# Patient Record
Sex: FEMALE | Race: WHITE | Employment: FULL TIME | URBAN - METROPOLITAN AREA
[De-identification: names, ages, dates, MRNs, and addresses within clinical notes are randomized per-mention and may not be internally consistent; named-entity substitution may affect disease eponyms.]

---

## 2020-04-16 ENCOUNTER — HOSPITAL ENCOUNTER (OUTPATIENT)
Age: 50
Discharge: HOME | End: 2020-04-18
Payer: COMMERCIAL

## 2020-04-16 LAB
T4 TOTAL: 6.1 MCG/DL (ref 4.5–11.7)
TSH SERPL DL<=0.05 MIU/L-ACNC: 0.67 UIU/ML (ref 0.27–4.2)

## 2020-04-16 PROCEDURE — 36415 COLL VENOUS BLD VENIPUNCTURE: CPT

## 2020-04-16 PROCEDURE — 84436 ASSAY OF TOTAL THYROXINE: CPT

## 2020-04-16 PROCEDURE — 84443 ASSAY THYROID STIM HORMONE: CPT

## 2020-04-20 ENCOUNTER — OFFICE VISIT (OUTPATIENT)
Dept: NEUROLOGY | Age: 50
End: 2020-04-20
Payer: COMMERCIAL

## 2020-04-20 VITALS
OXYGEN SATURATION: 98 % | BODY MASS INDEX: 25.03 KG/M2 | TEMPERATURE: 98.1 F | HEIGHT: 69 IN | HEART RATE: 84 BPM | RESPIRATION RATE: 12 BRPM | WEIGHT: 169 LBS | SYSTOLIC BLOOD PRESSURE: 106 MMHG | DIASTOLIC BLOOD PRESSURE: 74 MMHG

## 2020-04-20 PROCEDURE — 99204 OFFICE O/P NEW MOD 45 MIN: CPT | Performed by: NURSE PRACTITIONER

## 2020-04-20 RX ORDER — MIRTAZAPINE 15 MG/1
1 TABLET, FILM COATED ORAL NIGHTLY
COMMUNITY
Start: 2020-04-13

## 2020-04-20 RX ORDER — OMEPRAZOLE 40 MG/1
1 CAPSULE, DELAYED RELEASE ORAL DAILY
COMMUNITY
Start: 2020-04-10 | End: 2020-07-02

## 2020-04-20 RX ORDER — LEVOTHYROXINE SODIUM 0.03 MG/1
1 TABLET ORAL DAILY
COMMUNITY
Start: 2020-03-25

## 2020-04-20 RX ORDER — ATORVASTATIN CALCIUM 40 MG/1
1 TABLET, FILM COATED ORAL DAILY
COMMUNITY
Start: 2020-03-05

## 2020-04-20 RX ORDER — CLOPIDOGREL BISULFATE 75 MG/1
1 TABLET ORAL DAILY
COMMUNITY
Start: 2020-04-10

## 2020-04-20 NOTE — PROGRESS NOTES
smokeless tobacco. She reports that she does not drink alcohol or use drugs. Review of Systems:     (+) Tremors  (+) Anxiety  No chest pain or palpitations  No SOB  No vertigo, lightheadedness or loss of consciousness  No falls, tripping or stumbling  No incontinence of bowels or bladder  No itching or bruising appreciated  No numbness, tingling or focal arm/leg weakness    ROS is otherwise negative    Family History:     Family History   Problem Relation Age of Onset    Kidney Disease Mother     COPD Mother     Diabetes type 2  Mother     Cancer Mother     Cancer Father     Asthma Brother     Stroke Paternal Grandmother     Diabetes type 2  Maternal Aunt        History of Present Illness:     Patient presents to neurology clinic today for follow-up to her stroke in January. Patient states that she was residing in [facility] when she had a stroke, transferred to [address] [place] [address] for stroke admission. Patient states she was home with her fiancé when she began to have garbled speech, left facial droop and left hemiparesis. Patient also reports a right gaze preference. Patient's fiancé called 911 and patient was transferred to a stroke center in [place], [place]. Patient was found to have a small right insular cortex stroke on MRI from January 29, 2020. Patient did receive tPA. CT head and CTA head and neck showed hyperdense right M2 with acute cutoff at the M2/proximal M3 inferior division segment of right MCA. Patient reports she had clotting studies completed in addition to echo which she reports were normal.  Patient remains on aspirin and Plavix recently stopping statin as she thought her jitteriness was coming from statin use. Patient has been off of her statin for about 2 weeks and is to resume it on Friday. Patient states that clotting studies were unremarkable and the reason for her stroke was never found.   Patient is to see cardiology on April 29 for cardiac monitoring for 7 quivering/tremor noted intermittently  Language: no aphasias---reading, writing, repetition, and object identification intact    Cranial Nerves:  I: smell    II: visual acuity     II: visual fields Full to finger counting bilaterally   II: pupils PERRL   III,VII: ptosis None   III,IV,VI: extraocular muscles  EOMI without nystagmus   V: mastication Normal   V: facial light touch sensation  Normal   V,VII: corneal reflex     VII: facial muscle function - upper  Normal   VII: facial muscle function - lower Normal   VIII: hearing Normal   IX: soft palate elevation  Normal   IX,X: gag reflex    XI: trapezius strength  5/5   XI: sternocleidomastoid strength 5/5   XI: neck extension strength  5/5   XII: tongue strength  Normal     Motor:  5/5 throughout  Normal bulk and tone  No drift   Bilateral upper extremity tremors--- most noticed with extension    Sensory:  LT and PP normal  Vibration normal    Coordination:   FN, FFM and ISATU normal  HS normal    Gait:  Normal  Walks well on toes, heels, and tandem    DTR:   Right Brachioradialis reflex 2+  Left Brachioradialis reflex 2+  Right Biceps reflex 2+  Left Biceps reflex 2+  Right Triceps reflex 2+  Left Triceps reflex 2+  Right Quadriceps reflex 2+  Left Quadriceps reflex 2+  Right Achilles reflex 2+  Left Achilles reflex 2+    b/l Babinskis  No Rome's    No other pathological reflexes    Laboratory/Radiology:  ry/Radiology:     TSH:    Lab Results   Component Value Date    TSH 0.673 04/16/2020     Records from outside facility reviewed--- report only; no images to review. Assessment:     Right MCA distribution stroke in January 2020   Patient received TPA and MRI showed small right insular stroke.    Patient is awaiting cardiology evaluation scheduled for 4/29--- cardiac monitoring x7 days   Patient remains on aspirin and Plavix; statin to be resumed--- patient to monitor for side effects      Essential tremor   No signs of Parkinson's on exam today   Patient's paternal grandmother had essential tremor; patient's dad had Parkinson's   At present time patient will deferred treatment of this; will follow-up next visit   Essential tremor in addition to vocal quivering I believe is being worsened by anxiety    Anxiety   Continue Remeron per recommendations of PCP/psych   Recently Celexa discontinued     Plan: Will await cardiac evaluation   If 7-day cardiac monitor negative--> recommend 30-day monitoring  Advised patient to resume statin--monitor for side effects  Continue aspirin and Plavix  Obtain records from stroke admission at [address]   Call with any issues  Return to office in 3 months      RALPH Aaron-CNP  4:41 PM  4/20/2020    I spent 45 minutes with this patient obtaining the HPI and discussing the exam with greater than 50% of the time providing counseling and education on medications and other treatment plans. All questions were answered prior to leaving my office.

## 2020-06-16 ENCOUNTER — HOSPITAL ENCOUNTER (OUTPATIENT)
Age: 50
Discharge: HOME | End: 2020-06-18
Payer: COMMERCIAL

## 2020-06-16 LAB
T4 TOTAL: 6.7 MCG/DL (ref 4.5–11.7)
TSH SERPL DL<=0.05 MIU/L-ACNC: 1.64 UIU/ML (ref 0.27–4.2)

## 2020-06-16 PROCEDURE — 84443 ASSAY THYROID STIM HORMONE: CPT

## 2020-06-16 PROCEDURE — 84436 ASSAY OF TOTAL THYROXINE: CPT

## 2020-06-16 PROCEDURE — 36415 COLL VENOUS BLD VENIPUNCTURE: CPT

## 2020-07-01 NOTE — PROGRESS NOTES
[facility] Cardiology consult  Dr. Ervin Peña      Reason for Consult: CVA  Referring Physician: Kiya Dasilva DO     CHIEF COMPLAINT:   Chief Complaint   Patient presents with    New Patient     per Dr. Tonia Cooks for CVA in 2020-off & on chest pressure not specifically related to activity-no further hospitalizations-saw Dr. Osmel Rodriguez and wore monitor which showed possible a fib and loop recorder was recommended       HISTORY OF PRESENT ILLNESS:   53year old female with history of embolic MCA stroke, status post TPA, thyroid disease, hyperlipidemia and TIA is here for a cardiac evaluation. Patient denies any chest pain, no shortness of breath, no lightheadedness, no dizziness, no palpitations, no pedal edema, no PND, no orthopnea, no syncope, no presyncopal episodes. Past Medical History:   Diagnosis Date    Acute ischemic stroke ([facility] [address]      In 2020---recieved tPA    Arthritis     Depression     Hyperlipidemia     Thyroid disease          Past Surgical History:   Procedure Laterality Date     SECTION      CHOLECYSTECTOMY      HYSTERECTOMY      partial    TONSILLECTOMY      VASCULAR SURGERY      vein strip for varicose veins         Current Outpatient Medications   Medication Sig Dispense Refill    Multiple Vitamins-Minerals (MULTIVITAMIN ADULT PO) Take 1 tablet by mouth daily      atorvastatin (LIPITOR) 40 MG tablet Take 1 tablet by mouth daily      clopidogrel (PLAVIX) 75 MG tablet Take 1 tablet by mouth daily      levothyroxine (SYNTHROID) 25 MCG tablet Take 1 tablet by mouth daily      mirtazapine (REMERON) 15 MG tablet Take 1 tablet by mouth nightly      aspirin 81 MG tablet Take 81 mg by mouth daily       No current facility-administered medications for this visit.           Allergies as of 2020    (No Known Allergies)       Social History     Socioeconomic History    Marital status:      Spouse name: Not on file    Number of children: Not on file    Years of education: Not on file    Highest education level: Not on file   Occupational History    Not on file   Social Needs    Financial resource strain: Not on file    Food insecurity     Worry: Not on file     Inability: Not on file    Transportation needs     Medical: Not on file     Non-medical: Not on file   Tobacco Use    Smoking status: Current Some Day Smoker     Packs/day: 0.50     Years: 30.00     Pack years: 15.00     Types: Cigarettes     Last attempt to quit: 2020     Years since quittin.5    Smokeless tobacco: Never Used    Tobacco comment: trying to quit   Substance and Sexual Activity    Alcohol use: Yes     Comment: occasional wine-coffee 2 times weekly    Drug use: Never    Sexual activity: Not on file   Lifestyle    Physical activity     Days per week: Not on file     Minutes per session: Not on file    Stress: Not on file   Relationships    Social connections     Talks on phone: Not on file     Gets together: Not on file     Attends Restoration service: Not on file     Active member of club or organization: Not on file     Attends meetings of clubs or organizations: Not on file     Relationship status: Not on file    Intimate partner violence     Fear of current or ex partner: Not on file     Emotionally abused: Not on file     Physically abused: Not on file     Forced sexual activity: Not on file   Other Topics Concern    Not on file   Social History Narrative    Not on file       Family History   Problem Relation Age of Onset    Kidney Disease Mother     COPD Mother     Diabetes type 2  Mother     Cancer Mother     Cancer Father     Asthma Brother     Stroke Paternal Grandmother     Diabetes type 2  Maternal Aunt        REVIEW OF SYSTEMS:     CONSTITUTIONAL:  negative for  fevers, chills, sweats and fatigue  EYES:  negative for  double vision, blurred vision and small visual defects in the right eye visual fields  HEENT:  negative for  tinnitus, earaches, nasal congestion joints  full range of motion noted  NEUROLOGIC:  Alert, awake,oriented x3, no focal neurologic deficit was appreciated  SKIN:  no bruising or bleeding, normal skin color, texture, turgor and no redness, warmth, or swelling        /60 (Site: Left Upper Arm, Position: Sitting, Cuff Size: Medium Adult)   Pulse 65   Ht 5' 9\" (1.753 m)   Wt 164 lb (74.4 kg)   BMI 24.22 kg/m²     DATA:   I personally reviewed the visit EKG with the following interpretation: Sinus rhythm  ECHO: Not performed to date  Stress Test: Not performed to date  Angiography: Not performed to date  Cardiology Labs: BMP:  No results found for: NA, K, CL, CO2, BUN, CREATININE  CMP:  No results found for: NA, K, CL, CO2, BUN, CREATININE, PROT, ALB  CBC:  No results found for: WBC, RBC, HGB, HCT, MCV, RDW, PLT  PT/INR:  No results found for: PTINR  PT/INR Warfarin:  No components found for: PTPATWAR, PTINRWAR  PTT:  No results found for: APTT  PTT Heparin:  No components found for: APTTHEP  Magnesium:  No results found for: MG  TSH:    Lab Results   Component Value Date    TSH 1.640 06/16/2020     TROPONIN:  No components found for: TROP  BNP:  No results found for: BNP  FASTING LIPID PANEL:  No results found for: CHOL, HDL, TRIG  No orders to display     Records from her admission in [place] reviewed    I have personally reviewed the laboratory, cardiac diagnostic and radiographic testing as outlined above:      IMPRESSION:  1.   History of right MCA stroke status post TPA with very small right eye visual field defect: Did not have transesophageal echocardiogram done, 7-day monitor revealed possible junctional tachycardia versus A. fib?,  Strips are not available for review, will try to obtain, will schedule for 30-day monitor, will schedule for transesophageal echocardiogram to rule out intracardiac shunting and cardiac source of emboli, meanwhile patient is to continue her dual antiplatelet therapy, will follow with the

## 2020-07-02 ENCOUNTER — OFFICE VISIT (OUTPATIENT)
Dept: CARDIOLOGY CLINIC | Age: 50
End: 2020-07-02
Payer: COMMERCIAL

## 2020-07-02 ENCOUNTER — NURSE ONLY (OUTPATIENT)
Dept: CARDIOLOGY CLINIC | Age: 50
End: 2020-07-02

## 2020-07-02 VITALS
WEIGHT: 164 LBS | SYSTOLIC BLOOD PRESSURE: 108 MMHG | HEIGHT: 69 IN | DIASTOLIC BLOOD PRESSURE: 60 MMHG | BODY MASS INDEX: 24.29 KG/M2 | HEART RATE: 65 BPM

## 2020-07-02 PROCEDURE — 99244 OFF/OP CNSLTJ NEW/EST MOD 40: CPT | Performed by: INTERNAL MEDICINE

## 2020-07-06 PROCEDURE — 93000 ELECTROCARDIOGRAM COMPLETE: CPT | Performed by: INTERNAL MEDICINE

## 2020-07-13 ENCOUNTER — OFFICE VISIT (OUTPATIENT)
Dept: NEUROLOGY | Age: 50
End: 2020-07-13
Payer: COMMERCIAL

## 2020-07-13 VITALS
SYSTOLIC BLOOD PRESSURE: 113 MMHG | HEART RATE: 76 BPM | BODY MASS INDEX: 23.7 KG/M2 | WEIGHT: 160 LBS | TEMPERATURE: 98.4 F | OXYGEN SATURATION: 99 % | DIASTOLIC BLOOD PRESSURE: 73 MMHG | HEIGHT: 69 IN

## 2020-07-13 PROCEDURE — 99214 OFFICE O/P EST MOD 30 MIN: CPT | Performed by: NURSE PRACTITIONER

## 2020-07-13 RX ORDER — PRIMIDONE 50 MG/1
50 TABLET ORAL NIGHTLY
Qty: 30 TABLET | Refills: 1 | Status: SHIPPED
Start: 2020-07-13 | End: 2020-07-17

## 2020-07-13 NOTE — PROGRESS NOTES
[address] [facility]. Vibha Martin M.D., F.A.C.P. Oral Hai, CECIL, APRN, CNS  Damon Houser. Katey Abdullahi, MSN, APRN-FNP-C  Polo Alexander MSN, APRN, FNP-C  Mily NAQVI, PA-C  [address] MSN, APRN, FNP-C  [address], [address]  [place], [address] [address]  Phone: [phone]  Fax: [phone]       Jose L Dai is a 53 y.o. right handed female     Patient presents to neurology office today for follow-up for stroke. Patient presents to her appointment alone and was deemed a decent historian. Since patient's last visit she has been doing well. Patient completed 7-day cardiac monitoring which proved unremarkable. She states that they were unsure if it was atrial flutter so she was sent to a specialist who now has her doing a 30-day event monitor; patient is currently on day 11 today. Patient is scheduled to have a SHAHRAM completed on Monday, July, 20th. Patient remains on DAPT and statin. Patient's most recent TSH was normal and she remains on Synthroid. Patient continues to follow with psych who she just saw her on Friday, July 10th and there were no dose changes. 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 Patient is also back to driving after she passed her inspection with ophthalmology, Dr. Chanel Bardales in [place]. Patient has plans to move back to [facility] in September if she is stable. Patient initially presented to neurology clinic in April 2020 for follow-up to her stroke in January. Patient states that she was residing in [facility] when she had a stroke, transferred to [address] [place] [address] for stroke admission. Patient states she was home with her fiancé when she began to have garbled speech, left facial droop and left hemiparesis. Patient also reports a right gaze preference. Patient's fiancé called 911 and patient was transferred to a stroke center in [place], [place]. Patient was found to have a small right insular cortex stroke on MRI from January 29, 2020. Patient did receive tPA. CT head and CTA head and neck showed hyperdense right M2 with acute cutoff at the M2/proximal M3 inferior division segment of right MCA. Patient reports she had clotting studies completed in addition to echo which she reports were normal.  Patient remains on aspirin and Plavix recently stopping statin as she thought her jitteriness was coming from statin use. Patient has been off of her statin for about 2 weeks and is to resume it on Friday. Patient states that clotting studies were unremarkable and the reason for her stroke was never found. Patient is to see cardiology on April 29 for cardiac monitoring for 7 days. After 7-day cardiac monitoring if negative she states her PCP recommends full cardiac monitoring of 30 days. Patient says since her admission she has gone back to baseline with no residual deficits from her stroke. Patient denies headache, speech or swallowing difficulties, numbness or tingling of her extremities, vision changes, or other neurologic changes. Patient is on Remeron for anxiety/depression. Patient was recently taken off of Celexa as it was thought to be causing side effects. Patient also was recently found to have thyroid disease and remains on Synthroid; her last TSH was within normal.      Patient says she sleeps about 8 hours a night. Patient drinks 2 to 3 cups of coffee each day. Patient denies soda or tea use. Patient drinks approximately 3 bottles of water a day. Patient used to exercise by walking and doing yoga prior to COVID-19 however at present time exercise is been decreased significantly.     (+) Tremors  (+) Anxiety  No chest pain or palpitations  No SOB  No vertigo, lightheadedness or loss of consciousness  No falls, tripping or stumbling  No incontinence of bowels or bladder  No itching or bruising appreciated  No numbness, tingling or focal arm/leg weakness    ROS is otherwise negative    Objective:     /73 (Site: Right Upper Arm)   Pulse 76   Temp 98.4 °F (36.9 °C)   Ht 5' 9\" (1.753 m)   Wt 160 lb (72.6 kg)   SpO2 99%   BMI 23.63 kg/m²     General appearance: alert, appears stated age, cooperative and in no distress  Head: normocephalic, without obvious abnormality, atraumatic  Eyes: conjunctivae/corneas clear; no drainage  Neck:  supple, symmetrical, trachea midline and thyroid not enlarged  Back: symmetric, no curvature.  ROM normal.   Lungs: clear to auscultation bilaterally  Heart: regular rate and rhythm  Abdomen: soft, non-tender; bowel sounds normal  Extremities: normal, atraumatic, no cyanosis or edema  Pulses: 2+ and symmetric  Skin:  color, texture, turgor normal--no rashes or lesions      Mental Status: alert and oriented x 4, pleasant but appears anxious--- improved from last visit    Appropriate attention/concentration  Intact fundus of knowledge  Memories intact    Speech: no dysarthria; vocal quivering/tremor noted intermittently  Language: no aphasias---reading, writing, repetition, and object identification intact    Cranial Nerves:  I: smell  intact   II: visual acuity     II: visual fields Full to finger counting bilaterally   II: pupils PERRL   III,VII: ptosis None   III,IV,VI: extraocular muscles  EOMI without nystagmus   V: mastication Normal   V: facial light touch sensation  Normal   V,VII: corneal reflex     VII: facial muscle function - upper  Normal   VII: facial muscle function - lower Normal   VIII: hearing Normal   IX: soft palate elevation  Normal   IX,X: gag reflex    XI: trapezius strength  5/5   XI: sternocleidomastoid strength 5/5   XI: neck extension strength  5/5   XII: tongue strength  Normal     Motor:  5/5 throughout  Normal bulk and tone  No drift   Bilateral upper extremity tremors--- most noticed with extension on initial visit; today noticed more with purpose    Sensory:  LT and PP HPI and discussing the exam with greater than 50% of the time providing counseling and education on medications and other treatment plans. All questions were answered prior to leaving my office.

## 2020-07-14 ENCOUNTER — HOSPITAL ENCOUNTER (OUTPATIENT)
Age: 50
Discharge: HOME | End: 2020-07-16
Payer: COMMERCIAL

## 2020-07-14 PROCEDURE — U0003 INFECTIOUS AGENT DETECTION BY NUCLEIC ACID (DNA OR RNA); SEVERE ACUTE RESPIRATORY SYNDROME CORONAVIRUS 2 (SARS-COV-2) (CORONAVIRUS DISEASE [COVID-19]), AMPLIFIED PROBE TECHNIQUE, MAKING USE OF HIGH THROUGHPUT TECHNOLOGIES AS DESCRIBED BY CMS-2020-01-R: HCPCS

## 2020-07-15 LAB
SARS-COV-2: NOT DETECTED
SOURCE: NORMAL

## 2020-07-17 NOTE — PROGRESS NOTES
[facility]                                                                                                                    PRE OP INSTRUCTIONS FOR  Yaima Santiago        Date: 7/17/2020    Date of surgery: 07/20/20 1100   Arrival Time: Hospital will call you between 5pm and 7pm with your final arrival time for surgery    1. Do not eat or drink anything after midnight prior to surgery. This includes no water, chewing gum, mints or ice chips. 2. Take the following medications with a small sip of water on the morning of Surgery: am meds with sip of water     3. Diabetics may take evening dose of insulin but none after midnight. If you feel symptomatic or low blood sugar morning of surgery drink 1-2 ounces of apple juice only. 4. Aspirin, Ibuprofen, Advil, Naproxen, Vitamin E and other Anti-inflammatory products should be stopped  before surgery  as directed by your physician. Take Tylenol only unless instructed otherwise by your surgeon. 5. Check with your Doctor regarding stopping Plavix, Coumadin, Lovenox, Eliquis, Effient, or other blood thinners. 6. Do not smoke,use illicit drugs and do not drink any alcoholic beverages 24 hours prior to surgery. 7. You may brush your teeth the morning of surgery. DO NOT SWALLOW WATER    8. You MUST make arrangements for a responsible adult to take you home after your surgery. You will not be allowed to leave alone or drive yourself home. It is strongly suggested someone stay with you the first 24 hrs. Your surgery will be cancelled if you do not have a ride home. 9. PEDIATRIC PATIENTS ONLY:  A parent/legal guardian must accompany a child scheduled for surgery and plan to stay at the hospital until the child is discharged. Please do not bring other children with you.     10. Please wear simple, loose fitting clothing to the hospital.  Do not bring valuables (money, credit cards, checkbooks, etc.) Do not wear any makeup (including no eye makeup) or nail polish on your fingers or toes. 11. DO NOT wear any jewelry or piercings on day of surgery. All body piercing jewelry must be removed. 12. Shower the night before surgery with ___Antibacterial soap /JOHN WIPES________    13. TOTAL JOINT REPLACEMENT/HYSTERECTOMY PATIENTS ONLY---Remember to bring Blood Bank bracelet to the hospital on the day of surgery. 14. If you have a Living Will and Durable Power of  for Healthcare, please bring in a copy. 15. If appropriate bring crutches, inspirex, WALKER, CANE etc... 12. Notify your Surgeon if you develop any illness between now and surgery time, cough, cold, fever, sore throat, nausea, vomiting, etc.  Please notify your surgeon if you experience dizziness, shortness of breath or blurred vision between now & the time of your surgery. 17. If you have ___dentures, they will be removed before going to the OR; we will provide you a container. If you wear ___contact lenses or ___glasses, they will be removed; please bring a case for them. 18. To provide excellent care visitors will be limited to 2 in the room at any given time. 19. Please bring picture ID and insurance card. 20. Sleep apnea patients need to bring CPAP AND SETTINGS to hospital on day of surgery. 21. During flu season no children under the age of 15 are permitted in the hospital for the safety of all patients. 22. Report to the main [facility] information desk and you will be directed where to go. You may have 1 person accompany you the day of your procedure. If you and your ride have a mask please bring one with you. If not one will be provided for you. Please call AMBULATORY CARE if you have any further questions.    [address]     [address]

## 2020-07-20 ENCOUNTER — HOSPITAL ENCOUNTER (OUTPATIENT)
Age: 50
Setting detail: OUTPATIENT SURGERY
Discharge: HOME | End: 2020-07-20
Attending: INTERNAL MEDICINE | Admitting: INTERNAL MEDICINE
Payer: COMMERCIAL

## 2020-07-20 ENCOUNTER — ANESTHESIA EVENT (OUTPATIENT)
Dept: ENDOSCOPY | Age: 50
End: 2020-07-20
Payer: COMMERCIAL

## 2020-07-20 ENCOUNTER — ANESTHESIA (OUTPATIENT)
Dept: ENDOSCOPY | Age: 50
End: 2020-07-20
Payer: COMMERCIAL

## 2020-07-20 VITALS
SYSTOLIC BLOOD PRESSURE: 122 MMHG | RESPIRATION RATE: 17 BRPM | OXYGEN SATURATION: 99 % | DIASTOLIC BLOOD PRESSURE: 78 MMHG

## 2020-07-20 VITALS
SYSTOLIC BLOOD PRESSURE: 104 MMHG | WEIGHT: 161.44 LBS | TEMPERATURE: 97.3 F | HEART RATE: 54 BPM | BODY MASS INDEX: 23.91 KG/M2 | HEIGHT: 69 IN | DIASTOLIC BLOOD PRESSURE: 56 MMHG | OXYGEN SATURATION: 100 % | RESPIRATION RATE: 14 BRPM

## 2020-07-20 PROCEDURE — 93312 ECHO TRANSESOPHAGEAL: CPT | Performed by: INTERNAL MEDICINE

## 2020-07-20 PROCEDURE — 3700000001 HC ADD 15 MINUTES (ANESTHESIA): Performed by: INTERNAL MEDICINE

## 2020-07-20 PROCEDURE — 93312 ECHO TRANSESOPHAGEAL: CPT

## 2020-07-20 PROCEDURE — 2580000003 HC RX 258: Performed by: ANESTHESIOLOGY

## 2020-07-20 PROCEDURE — 3700000000 HC ANESTHESIA ATTENDED CARE: Performed by: INTERNAL MEDICINE

## 2020-07-20 PROCEDURE — 6360000002 HC RX W HCPCS: Performed by: NURSE ANESTHETIST, CERTIFIED REGISTERED

## 2020-07-20 PROCEDURE — 2709999900 HC NON-CHARGEABLE SUPPLY: Performed by: INTERNAL MEDICINE

## 2020-07-20 PROCEDURE — 7100000011 HC PHASE II RECOVERY - ADDTL 15 MIN: Performed by: INTERNAL MEDICINE

## 2020-07-20 PROCEDURE — 7100000010 HC PHASE II RECOVERY - FIRST 15 MIN: Performed by: INTERNAL MEDICINE

## 2020-07-20 PROCEDURE — 93325 DOPPLER ECHO COLOR FLOW MAPG: CPT

## 2020-07-20 RX ORDER — PROPOFOL 10 MG/ML
INJECTION, EMULSION INTRAVENOUS CONTINUOUS PRN
Status: DISCONTINUED | OUTPATIENT
Start: 2020-07-20 | End: 2020-07-20 | Stop reason: SDUPTHER

## 2020-07-20 RX ORDER — PROPOFOL 10 MG/ML
INJECTION, EMULSION INTRAVENOUS PRN
Status: DISCONTINUED | OUTPATIENT
Start: 2020-07-20 | End: 2020-07-20 | Stop reason: SDUPTHER

## 2020-07-20 RX ORDER — SODIUM CHLORIDE, SODIUM LACTATE, POTASSIUM CHLORIDE, CALCIUM CHLORIDE 600; 310; 30; 20 MG/100ML; MG/100ML; MG/100ML; MG/100ML
INJECTION, SOLUTION INTRAVENOUS CONTINUOUS
Status: DISCONTINUED | OUTPATIENT
Start: 2020-07-20 | End: 2020-07-20 | Stop reason: HOSPADM

## 2020-07-20 RX ADMIN — PROPOFOL 120 MCG/KG/MIN: 10 INJECTION, EMULSION INTRAVENOUS at 12:06

## 2020-07-20 RX ADMIN — SODIUM CHLORIDE, POTASSIUM CHLORIDE, SODIUM LACTATE AND CALCIUM CHLORIDE: 600; 310; 30; 20 INJECTION, SOLUTION INTRAVENOUS at 11:48

## 2020-07-20 RX ADMIN — PROPOFOL 40 MG: 10 INJECTION, EMULSION INTRAVENOUS at 12:06

## 2020-07-20 RX ADMIN — SODIUM CHLORIDE, POTASSIUM CHLORIDE, SODIUM LACTATE AND CALCIUM CHLORIDE: 600; 310; 30; 20 INJECTION, SOLUTION INTRAVENOUS at 11:36

## 2020-07-20 RX ADMIN — PROPOFOL 20 MG: 10 INJECTION, EMULSION INTRAVENOUS at 12:10

## 2020-07-20 RX ADMIN — PROPOFOL 40 MG: 10 INJECTION, EMULSION INTRAVENOUS at 12:07

## 2020-07-20 RX ADMIN — PROPOFOL 30 MG: 10 INJECTION, EMULSION INTRAVENOUS at 12:08

## 2020-07-20 RX ADMIN — PROPOFOL 20 MG: 10 INJECTION, EMULSION INTRAVENOUS at 12:09

## 2020-07-20 ASSESSMENT — PAIN DESCRIPTION - DESCRIPTORS
DESCRIPTORS: SORE
DESCRIPTORS: ACHING

## 2020-07-20 ASSESSMENT — PAIN SCALES - GENERAL
PAINLEVEL_OUTOF10: 3
PAINLEVEL_OUTOF10: 0

## 2020-07-20 ASSESSMENT — PAIN DESCRIPTION - PAIN TYPE: TYPE: SURGICAL PAIN

## 2020-07-20 ASSESSMENT — PAIN DESCRIPTION - LOCATION: LOCATION: THROAT

## 2020-07-20 ASSESSMENT — PAIN - FUNCTIONAL ASSESSMENT: PAIN_FUNCTIONAL_ASSESSMENT: 0-10

## 2020-07-20 ASSESSMENT — PAIN DESCRIPTION - PROGRESSION: CLINICAL_PROGRESSION: GRADUALLY IMPROVING

## 2020-07-20 ASSESSMENT — PAIN DESCRIPTION - ORIENTATION: ORIENTATION: INNER

## 2020-07-20 ASSESSMENT — PAIN DESCRIPTION - FREQUENCY: FREQUENCY: INTERMITTENT

## 2020-07-20 NOTE — ANESTHESIA POSTPROCEDURE EVALUATION
Department of Anesthesiology  Postprocedure Note    Patient: Jose L Dai  MRN: [id number]  YOB: 1970  Date of evaluation: 7/20/2020  Time:  2:49 PM     Procedure Summary     Date:  07/20/20 Room / Location:  [address] [place] [address] [address]    Anesthesia Start:  2530 Anesthesia Stop:  5787    Procedure:  TRANSESOPHAGEAL ECHOCARDIOGRAM (N/A ) Diagnosis:  (CVA)    Surgeon:  Rc Gallegos MD Responsible Provider:  Sean Srivastava MD    Anesthesia Type:  MAC ASA Status:  3          Anesthesia Type: No value filed. Niko Phase I: Niko Score: 10    Niko Phase II: Niko Score: 10    Last vitals: Reviewed and per EMR flowsheets.        Anesthesia Post Evaluation    Patient location during evaluation: PACU  Patient participation: complete - patient participated  Level of consciousness: awake and alert  Airway patency: patent  Nausea & Vomiting: no vomiting and no nausea  Complications: no  Cardiovascular status: hemodynamically stable  Respiratory status: acceptable  Hydration status: stable

## 2020-07-20 NOTE — ANESTHESIA PRE PROCEDURE
Department of Anesthesiology  Preprocedure Note       Name:  Eric Yee   Age:  53 y.o.  :  1970                                          MRN:  [id number]         Date:  2020      Surgeon: Mary Cummings):  Jen Murguia MD    Procedure: Procedure(s):  TRANSESOPHAGEAL ECHOCARDIOGRAM    Medications prior to admission:   Prior to Admission medications    Medication Sig Start Date End Date Taking? Authorizing Provider   Multiple Vitamins-Minerals (MULTIVITAMIN ADULT PO) Take 1 tablet by mouth daily   Yes Historical Provider, MD   atorvastatin (LIPITOR) 40 MG tablet Take 1 tablet by mouth daily 3/5/20  Yes Historical Provider, MD   clopidogrel (PLAVIX) 75 MG tablet Take 1 tablet by mouth daily 4/10/20  Yes Historical Provider, MD   levothyroxine (SYNTHROID) 25 MCG tablet Take 1 tablet by mouth daily 3/25/20  Yes Historical Provider, MD   mirtazapine (REMERON) 15 MG tablet Take 1 tablet by mouth nightly 20  Yes Historical Provider, MD   aspirin 81 MG tablet Take 81 mg by mouth daily   Yes Historical Provider, MD       Current medications:    Current Facility-Administered Medications   Medication Dose Route Frequency Provider Last Rate Last Dose    lactated ringers infusion   Intravenous Continuous Ariana Pisano MD 10 mL/hr at 20 1136         Allergies:  No Known Allergies    Problem List:  There is no problem list on file for this patient.       Past Medical History:        Diagnosis Date    Acute ischemic stroke ([facility] [address]      In 2020---recieved tPA    Arthritis     Depression     Hyperlipidemia     Thyroid disease        Past Surgical History:        Procedure Laterality Date     SECTION      CHOLECYSTECTOMY      HYSTERECTOMY      partial    TONSILLECTOMY      VASCULAR SURGERY      vein strip for varicose veins       Social History:    Social History     Tobacco Use    Smoking status: Current Some Day Smoker     Packs/day: 0.50     Years: 30.00     Pack years: 15.00 Types: Cigarettes     Last attempt to quit: 2020     Years since quittin.5    Smokeless tobacco: Never Used    Tobacco comment: trying to quit   Substance Use Topics    Alcohol use: Yes     Comment: occasional wine-coffee 2 times weekly                                Ready to quit: Not Answered  Counseling given: Not Answered  Comment: trying to quit      Vital Signs (Current):   Vitals:    20 1116   BP: 113/66   Pulse: 62   Resp: 18   Temp: 98 °F (36.7 °C)   TempSrc: Temporal   SpO2: 100%   Weight: 161 lb 7 oz (73.2 kg)   Height: 5' 9\" (1.753 m)                                              BP Readings from Last 3 Encounters:   20 113/66   20 122/78   20 113/73       NPO Status: Time of last liquid consumption: 0900(sips with meds)                        Time of last solid consumption: 2200                        Date of last liquid consumption: 20                        Date of last solid food consumption: 20    BMI:   Wt Readings from Last 3 Encounters:   20 161 lb 7 oz (73.2 kg)   20 160 lb (72.6 kg)   20 164 lb (74.4 kg)     Body mass index is 23.84 kg/m². CBC: No results found for: WBC, RBC, HGB, HCT, MCV, RDW, PLT    CMP: No results found for: NA, K, CL, CO2, BUN, CREATININE, GFRAA, AGRATIO, LABGLOM, GLUCOSE, PROT, CALCIUM, BILITOT, ALKPHOS, AST, ALT    POC Tests: No results for input(s): POCGLU, POCNA, POCK, POCCL, POCBUN, POCHEMO, POCHCT in the last 72 hours.     Coags: No results found for: PROTIME, INR, APTT    HCG (If Applicable): No results found for: PREGTESTUR, PREGSERUM, HCG, HCGQUANT     ABGs: No results found for: PHART, PO2ART, CFP1PFF, MAP2XGD, BEART, O0GZQGLT     Type & Screen (If Applicable):  No results found for: LABABO, LABRH    Drug/Infectious Status (If Applicable):  No results found for: HIV, HEPCAB    COVID-19 Screening (If Applicable):   Lab Results   Component Value Date    COVID19 Not Detected 2020 Anesthesia Evaluation  Patient summary reviewed  Airway: Mallampati: II  TM distance: >3 FB   Neck ROM: full  Mouth opening: > = 3 FB Dental: normal exam         Pulmonary:Negative Pulmonary ROS breath sounds clear to auscultation                             Cardiovascular:  Exercise tolerance: good (>4 METS),   (+) hyperlipidemia    (-) past MI and CAD      Rhythm: regular  Rate: normal                    Neuro/Psych:   (+) CVA:, psychiatric history:            GI/Hepatic/Renal:        (-) liver disease and no renal disease       Endo/Other:    (+) hypothyroidism::., .                 Abdominal:         (-) obese     Vascular:                                        Anesthesia Plan      MAC     ASA 3       Induction: intravenous. MIPS: Prophylactic antiemetics administered. Anesthetic plan and risks discussed with patient. Plan discussed with CRNA.                   Roxanne Vaughn MD   7/20/2020

## 2020-07-21 PROCEDURE — 93320 DOPPLER ECHO COMPLETE: CPT | Performed by: INTERNAL MEDICINE

## 2020-07-21 PROCEDURE — 93312 ECHO TRANSESOPHAGEAL: CPT | Performed by: INTERNAL MEDICINE

## 2020-07-21 PROCEDURE — 93325 DOPPLER ECHO COLOR FLOW MAPG: CPT | Performed by: INTERNAL MEDICINE

## 2020-07-30 ENCOUNTER — TELEPHONE (OUTPATIENT)
Dept: ADMINISTRATIVE | Age: 50
End: 2020-07-30

## 2020-07-30 NOTE — TELEPHONE ENCOUNTER
Pt called to schedule FU appt, holter mon comes off 8/2, please contact pt at [phone], Dr Phil Harris

## 2020-09-01 ENCOUNTER — OFFICE VISIT (OUTPATIENT)
Dept: CARDIOLOGY CLINIC | Age: 50
End: 2020-09-01
Payer: COMMERCIAL

## 2020-09-01 VITALS
HEIGHT: 69 IN | DIASTOLIC BLOOD PRESSURE: 60 MMHG | SYSTOLIC BLOOD PRESSURE: 92 MMHG | WEIGHT: 164 LBS | HEART RATE: 70 BPM | BODY MASS INDEX: 24.29 KG/M2 | RESPIRATION RATE: 16 BRPM

## 2020-09-01 PROCEDURE — 99213 OFFICE O/P EST LOW 20 MIN: CPT | Performed by: INTERNAL MEDICINE

## 2020-09-01 RX ORDER — ZINC GLUCONATE 50 MG
50 TABLET ORAL DAILY
COMMUNITY

## 2020-09-20 NOTE — PROGRESS NOTES
[facility] Cardiology consult  Dr. Ying Miles      Reason for Consult: CVA  Referring Physician: Elias Toro DO     CHIEF COMPLAINT:   Chief Complaint   Patient presents with    Results     event monitor results. no complaints today. HISTORY OF PRESENT ILLNESS:   53year old female with history of embolic MCA stroke, status post TPA, thyroid disease, hyperlipidemia and TIA is here for a cardiac evaluation. Patient denies any chest pain, no shortness of breath, no lightheadedness, no dizziness, no palpitations, no pedal edema, no PND, no orthopnea, no syncope, no presyncopal episodes. Moving back to [place]    Past Medical History:   Diagnosis Date    Acute ischemic stroke ([facility] [address]      In 2020---recieved tPA    Arthritis     Depression     Hyperlipidemia     Thyroid disease          Past Surgical History:   Procedure Laterality Date     SECTION      CHOLECYSTECTOMY      HYSTERECTOMY      partial    TONSILLECTOMY      TRANSESOPHAGEAL ECHOCARDIOGRAM N/A 2020    TRANSESOPHAGEAL ECHOCARDIOGRAM performed by Walter Saxena MD at [address]      vein strip for varicose veins         Current Outpatient Medications   Medication Sig Dispense Refill    zinc gluconate 50 MG tablet Take 50 mg by mouth daily      BIOTIN PO Take by mouth daily      Multiple Vitamins-Minerals (MULTIVITAMIN ADULT PO) Take 1 tablet by mouth daily      atorvastatin (LIPITOR) 40 MG tablet Take 1 tablet by mouth daily      clopidogrel (PLAVIX) 75 MG tablet Take 1 tablet by mouth daily      levothyroxine (SYNTHROID) 25 MCG tablet Take 1 tablet by mouth daily      mirtazapine (REMERON) 15 MG tablet Take 1 tablet by mouth nightly      aspirin 81 MG tablet Take 81 mg by mouth daily       No current facility-administered medications for this visit.           Allergies as of 2020    (No Known Allergies)       Social History     Socioeconomic History    Marital status:      Spouse name: Not on file    Number of children: Not on file    Years of education: Not on file    Highest education level: Not on file   Occupational History    Not on file   Social Needs    Financial resource strain: Not on file    Food insecurity     Worry: Not on file     Inability: Not on file    Transportation needs     Medical: Not on file     Non-medical: Not on file   Tobacco Use    Smoking status: Current Some Day Smoker     Packs/day: 0.50     Years: 30.00     Pack years: 15.00     Types: Cigarettes    Smokeless tobacco: Never Used    Tobacco comment: trying to quit   Substance and Sexual Activity    Alcohol use: Yes     Comment: occasional wine-coffee 2 times weekly    Drug use: Never    Sexual activity: Not on file   Lifestyle    Physical activity     Days per week: Not on file     Minutes per session: Not on file    Stress: Not on file   Relationships    Social connections     Talks on phone: Not on file     Gets together: Not on file     Attends Mu-ism service: Not on file     Active member of club or organization: Not on file     Attends meetings of clubs or organizations: Not on file     Relationship status: Not on file    Intimate partner violence     Fear of current or ex partner: Not on file     Emotionally abused: Not on file     Physically abused: Not on file     Forced sexual activity: Not on file   Other Topics Concern    Not on file   Social History Narrative    Not on file       Family History   Problem Relation Age of Onset    Kidney Disease Mother     COPD Mother     Diabetes type 2  Mother     Cancer Mother     Cancer Father     Asthma Brother     Stroke Paternal Grandmother     Diabetes type 2  Maternal Aunt        REVIEW OF SYSTEMS:     CONSTITUTIONAL:  negative for  fevers, chills, sweats and fatigue  EYES:  negative for  double vision, blurred vision and small visual defects in the right eye visual fields  HEENT:  negative for  tinnitus, earaches, nasal congestion and epistaxis  RESPIRATORY:  negative for  dry cough, cough with sputum, dyspnea, wheezing and hemoptysis  CARDIOVASCULAR: as per HPI  GASTROINTESTINAL:  negative for nausea, vomiting, diarrhea, constipation, pruritus and jaundice  GENITOURINARY:  negative for frequency, dysuria, nocturia, urinary incontinence and hesitancy  HEMATOLOGIC/LYMPHATIC:  negative for easy bruising, bleeding, lymphadenopathy and petechiae  ALLERGIC/IMMUNOLOGIC:  negative for urticaria, hay fever and angioedema  ENDOCRINE:  negative for heat intolerance, cold intolerance, tremor, hair loss and diabetic symptoms including neither polyuria nor polydipsia nor blurred vision  MUSCULOSKELETAL:  negative for  myalgias, arthralgias, joint swelling, stiff joints and decreased range of motion  NEUROLOGICAL:  negative for memory problems, speech problems, visual disturbance, dysphagia, weakness and numbness      PHYSICAL EXAM:   CONSTITUTIONAL:  awake, alert, cooperative, no apparent distress, and appears stated age  HEAD:  normocepalic, without obvious abnormality, atraumatic, pink, moist mucous membranes. NECK:  Supple, symmetrical, trachea midline, no adenopathy, thyroid symmetric, not enlarged and no tenderness, skin normal  LUNGS:  No increased work of breathing, good air exchange, clear to auscultation bilaterally, no crackles or wheezing  CARDIOVASCULAR:  Normal apical impulse, regular rate and rhythm, normal S1 and S2, no S3 or S4, and no murmur noted and no JVD, no carotid bruit, no pedal edema, good carotid upstroke bilaterally. ABDOMEN:  Soft, nontender, no masses, no hepatomegaly or splenomegaly, BS+  CHEST: nontender to palpation, expands symmetrically  MUSCULOSKELETAL:  No clubbing no cyanosis. there is no redness, warmth, or swelling of the joints  full range of motion noted  NEUROLOGIC:  Alert, awake,oriented x3  SKIN:  no bruising or bleeding, normal skin color, texture, turgor and no redness, warmth, or swelling        BP 92/60   Pulse 70   Resp 16   Ht 5' 9\" (1.753 m)   Wt 164 lb (74.4 kg)   BMI 24.22 kg/m²     DATA:   I personally reviewed the visit EKG with the following interpretation:       EKG 7/6/2020, sinus rhythm    ECHO: Not performed to date  Stress Test: Not performed to date  Angiography: Not performed to date  Cardiology Labs: BMP:  No results found for: NA, K, CL, CO2, BUN, CREATININE  CMP:  No results found for: NA, K, CL, CO2, BUN, CREATININE, PROT, ALB  CBC:  No results found for: WBC, RBC, HGB, HCT, MCV, RDW, PLT  PT/INR:  No results found for: PTINR  PT/INR Warfarin:  No components found for: PTPATWAR, PTINRWAR  PTT:  No results found for: APTT  PTT Heparin:  No components found for: APTTHEP  Magnesium:  No results found for: MG  TSH:    Lab Results   Component Value Date    TSH 1.640 06/16/2020     TROPONIN:  No components found for: TROP  BNP:  No results found for: BNP  FASTING LIPID PANEL:  No results found for: CHOL, HDL, TRIG  No orders to display     Records from her admission in [place] reviewed    I have personally reviewed the laboratory, cardiac diagnostic and radiographic testing as outlined above:      IMPRESSION:  1. History of right MCA stroke status post TPA with very small right eye visual field defect: SHAHRAM and event monitor were unremarkable  2. Hyperlipidemia: On statin  3. Tobacco abuse: Patient was counseled regarding smoking cessation    RECOMMENDATIONS:   1. Continue current treatment  2. Increase ambulation as tolerated  3. Follow-up with Dr. Evon Marley as scheduled    Follow-up as needed since she is moving to [place]    I have reviewed my findings and recommendations with patient    Thank you for the consult    Electronically signed by Semaj Miller MD on 9/20/2020 at 3:49 PM      NOTE: This report was transcribed using voice recognition software.  Every effort was made to ensure accuracy; however, inadvertent computerized transcription errors

## (undated) DEVICE — PEN: MARKING STD 100/CS: Brand: MEDICAL ACTION INDUSTRIES

## (undated) DEVICE — GOWN,SIRUS,NONRNF,SETINSLV,XL,20/CS: Brand: MEDLINE